# Patient Record
Sex: MALE | Race: BLACK OR AFRICAN AMERICAN | Employment: UNEMPLOYED | URBAN - METROPOLITAN AREA
[De-identification: names, ages, dates, MRNs, and addresses within clinical notes are randomized per-mention and may not be internally consistent; named-entity substitution may affect disease eponyms.]

---

## 2018-02-02 ENCOUNTER — OFFICE VISIT (OUTPATIENT)
Dept: FAMILY MEDICINE CLINIC | Age: 23
End: 2018-02-02

## 2018-02-02 ENCOUNTER — HOSPITAL ENCOUNTER (OUTPATIENT)
Dept: LAB | Age: 23
Discharge: HOME | End: 2018-02-02
Payer: SELF-PAY

## 2018-02-02 VITALS
WEIGHT: 172 LBS | SYSTOLIC BLOOD PRESSURE: 113 MMHG | TEMPERATURE: 97.7 F | RESPIRATION RATE: 12 BRPM | HEIGHT: 71 IN | DIASTOLIC BLOOD PRESSURE: 74 MMHG | OXYGEN SATURATION: 98 % | BODY MASS INDEX: 24.08 KG/M2 | HEART RATE: 76 BPM

## 2018-02-02 DIAGNOSIS — Z20.2 EXPOSURE TO STD: Primary | ICD-10-CM

## 2018-02-02 DIAGNOSIS — Z20.2 EXPOSURE TO STD: ICD-10-CM

## 2018-02-02 LAB — RPR SER QL: NONREACTIVE

## 2018-02-02 PROCEDURE — 36415 COLL VENOUS BLD VENIPUNCTURE: CPT | Performed by: NURSE PRACTITIONER

## 2018-02-02 PROCEDURE — 86592 SYPHILIS TEST NON-TREP QUAL: CPT | Performed by: NURSE PRACTITIONER

## 2018-02-02 PROCEDURE — 87491 CHLMYD TRACH DNA AMP PROBE: CPT | Performed by: NURSE PRACTITIONER

## 2018-02-02 PROCEDURE — 87389 HIV-1 AG W/HIV-1&-2 AB AG IA: CPT | Performed by: NURSE PRACTITIONER

## 2018-02-02 NOTE — PROGRESS NOTES
HISTORY OF PRESENT ILLNESS  Kailey Rudd is a 25 y.o. male. Patient presents today with an exposure to STD    HPI  He recently had gotten treated for Chlamydia. Review of Systems   Constitutional: Negative. Respiratory: Negative. Cardiovascular: Negative. Gastrointestinal: Negative. Genitourinary: Negative. Visit Vitals    /74 (BP 1 Location: Right arm, BP Patient Position: Sitting)    Pulse 76    Temp 97.7 °F (36.5 °C) (Oral)    Resp 12    Ht 5' 11\" (1.803 m)    Wt 172 lb (78 kg)    SpO2 98%    BMI 23.99 kg/m2       Physical Exam   Constitutional: He appears well-developed. No distress. Eyes: Right eye exhibits no discharge. Left eye exhibits no discharge. Neck: Normal range of motion. Neck supple. Cardiovascular: Normal rate, regular rhythm and normal heart sounds. Pulmonary/Chest: Effort normal and breath sounds normal. No respiratory distress. He has no wheezes. He has no rales. Lymphadenopathy:     He has no cervical adenopathy. ASSESSMENT and PLAN    ICD-10-CM ICD-9-CM    1. Exposure to STD Z20.2 V01.6 CHLAMYDIA/NEISSERIA/TRICHOMONAS AMP      HIV 1/2 AG/AB, 4TH GENERATION,W RFLX CONFIRM      RPR     PLAN:  We discussed high risk exposure. We discussed the different STDs  Pt is to call with any concerns. Pt given after visit summary.

## 2018-02-02 NOTE — MR AVS SNAPSHOT
[address] [facility] 
 
 
 [address] [address] 26[address] [facility] [address] 
[phone] Patient: Adama Monte MRN: [id number] :1995 Visit Information Date & Time Provider Department Dept. Phone Encounter #  
 2018  8:45 AM [facility] Frankel[facility] [phone] [id number] Follow-up Instructions Return if symptoms worsen or fail to improve. Upcoming Health Maintenance Date Due DTaP/Tdap/Td series (1 - Tdap) 3/27/2016 Influenza Age 5 to Adult 2017 Allergies as of 2018  Review Complete On: 2018 By: Elias Frankel, NP No Known Allergies Current Immunizations  Never Reviewed No immunizations on file. Not reviewed this visit You Were Diagnosed With   
  
 Codes Comments Exposure to STD    -  Primary ICD-10-CM: Z20.2 ICD-9-CM: V01.6 Vitals BP Pulse Temp Resp Height(growth percentile) Weight(growth percentile) 113/74 (BP 1 Location: Right arm, BP Patient Position: Sitting) 76 97.7 °F (36.5 °C) (Oral) 12 5' 11\" (1.803 m) 172 lb (78 kg) SpO2 BMI Smoking Status 98% 23.99 kg/m2 Current Every Day Smoker BMI and BSA Data Body Mass Index Body Surface Area  
 23.99 kg/m 2 1.98 m 2 Preferred Pharmacy Pharmacy Name Phone [facility] [address] [place] [address] [phone] Your Updated Medication List  
  
Notice  As of 2018  9:25 AM  
 You have not been prescribed any medications. Follow-up Instructions Return if symptoms worsen or fail to improve. To-Do List   
 2018 Lab:  CHLAMYDIA/NEISSERIA/TRICHOMONAS AMP   
  
 2018 Lab:  HIV 1/2 AG/AB, 4TH GENERATION,W RFLX CONFIRM   
  
 2018 Lab:  R[facility] Introducing [facility] HE[facility]!    
 [facility] introduces "[employer] patient portal. Now you can access parts of your medical record, email your doctor's office, and request medication refills online. 1. In your internet browser, go to [email or web address] 2. Click on the First Time User? Click Here link in the Sign In box. You will see the New Member Sign Up page. 3. Enter your MedA[employer] Systems Access Code exactly as it appears below. You will not need to use this code after youve completed the sign-up process. If you do not sign up before the expiration date, you must request a new code. · [employer] Access Code: [email or web address] Expires: 5/3/2018  8:58 AM 
 
4. Enter the last four digits of your Social Security Number (xxxx) and Date of Birth (mm/dd/yyyy) as indicated and click Submit. You will be taken to the next sign-up page. 5. Create a [employer] Systems ID. This will be your [employer] login ID and cannot be changed, so think of one that is secure and easy to remember. 6. Create a [employer] password. You can change your password at any time. 7. Enter your Password Reset Question and Answer. This can be used at a later time if you forget your password. 8. Enter your e-mail address. You will receive e-mail notification when new information is available in [address] 9. Click Sign Up. You can now view and download portions of your medical record. 10. Click the Download Summary menu link to download a portable copy of your medical information. If you have questions, please visit the Frequently Asked Questions section of the [employer] website. Remember, [employer] Systems is NOT to be used for urgent needs. For medical emergencies, dial 911. Now available from your iPhone and Android! Please provide this summary of care documentation to your next provider. Your primary care clinician is listed as Susana Calderón. If you have any questions after today's visit, please call [phone].

## 2018-02-02 NOTE — PROGRESS NOTES
1. Have you been to the ER, urgent care clinic since your last visit? Hospitalized since your last visit? No    2. Have you seen or consulted any other health care providers outside of the [address] [place] [address] since your last visit? Include any pap smears or colon screening.  No

## 2018-02-05 ENCOUNTER — TELEPHONE (OUTPATIENT)
Dept: FAMILY MEDICINE CLINIC | Age: 23
End: 2018-02-05

## 2018-02-05 LAB
HIV 1+2 AB+HIV1 P24 AG SERPL QL IA: NONREACTIVE
HIV12 RESULT COMMENT, HHIVC: NORMAL

## 2018-02-05 NOTE — TELEPHONE ENCOUNTER
----- Message from Alxeey Jacobs NP sent at 2/4/2018 12:32 PM EST -----  Please advise Pt that his test for syphilis is negative. Other test still pending results.

## 2018-02-06 ENCOUNTER — TELEPHONE (OUTPATIENT)
Dept: FAMILY MEDICINE CLINIC | Age: 23
End: 2018-02-06

## 2018-02-06 LAB
C TRACH RRNA SPEC QL NAA+PROBE: NEGATIVE
N GONORRHOEA RRNA SPEC QL NAA+PROBE: NEGATIVE
SPECIMEN SOURCE: NORMAL
T VAGINALIS RRNA SPEC QL NAA+PROBE: NEGATIVE

## 2018-02-06 NOTE — TELEPHONE ENCOUNTER
----- Message from Radha Muniz NP sent at 2/6/2018  1:19 PM EST -----  Please advise Pt culture negative for STD

## 2018-03-14 ENCOUNTER — OFFICE VISIT (OUTPATIENT)
Dept: FAMILY MEDICINE CLINIC | Age: 23
End: 2018-03-14

## 2018-03-14 VITALS
HEIGHT: 71 IN | OXYGEN SATURATION: 100 % | DIASTOLIC BLOOD PRESSURE: 81 MMHG | TEMPERATURE: 100.8 F | WEIGHT: 174 LBS | HEART RATE: 111 BPM | SYSTOLIC BLOOD PRESSURE: 132 MMHG | BODY MASS INDEX: 24.36 KG/M2 | RESPIRATION RATE: 18 BRPM

## 2018-03-14 DIAGNOSIS — J02.9 PHARYNGITIS, UNSPECIFIED ETIOLOGY: ICD-10-CM

## 2018-03-14 DIAGNOSIS — R50.9 FEVER, UNSPECIFIED FEVER CAUSE: Primary | ICD-10-CM

## 2018-03-14 LAB
QUICKVUE INFLUENZA TEST: NEGATIVE
S PYO AG THROAT QL: NEGATIVE
VALID INTERNAL CONTROL?: YES
VALID INTERNAL CONTROL?: YES

## 2018-03-14 RX ORDER — AMOXICILLIN 875 MG/1
875 TABLET, FILM COATED ORAL 2 TIMES DAILY
Qty: 20 TAB | Refills: 0 | Status: SHIPPED | OUTPATIENT
Start: 2018-03-14 | End: 2018-03-24

## 2018-03-14 NOTE — PROGRESS NOTES
OFFICE NOTE    Erica Martel is a 25 y.o. male presenting today for office visit. 3/14/2018  1:50 PM    Chief Complaint   Patient presents with    Establish Care    Sore Throat    Fatigue    Chills       HPI: Here today as a new patient, transferring care from [facility]. Used to see Dr Srinivas Youngblood. Main complaints today of cold symptoms that have been happening since Saturday. Missed school this week due to feeling poorly. Has been having geadaches, a little nasal congestion, sore throat, fatigue, and chills that have been happening since Saturday. Most bothersome symptom is sore throat and chills. Has not been around sick contacts. Did not get the flu shot. Has not been taking anything to help. Review of Systems   Constitutional: Positive for chills, fatigue and fever. HENT: Positive for congestion, rhinorrhea and sore throat. Negative for ear pain, facial swelling, postnasal drip, sinus pain, sinus pressure, sneezing, trouble swallowing and voice change. Eyes: Negative for pain, discharge, itching and visual disturbance. Respiratory: Negative for cough, shortness of breath and wheezing. Cardiovascular: Negative for chest pain. Gastrointestinal: Negative for abdominal pain, diarrhea, nausea and vomiting. Musculoskeletal: Negative for arthralgias and myalgias. Skin: Negative for rash. Allergic/Immunologic: Negative for environmental allergies. Neurological: Negative for headaches. PHQ Screening   PHQ over the last two weeks 3/14/2018   Little interest or pleasure in doing things Several days   Feeling down, depressed or hopeless Several days   Total Score PHQ 2 2         History  Past Medical History:   Diagnosis Date    Kidney laceration 09/2013    from tackle in football- left kidney       No past surgical history on file.     Social History     Social History    Marital status: SINGLE     Spouse name: N/A    Number of children: N/A    Years of education: N/A Occupational History    Not on file. Social History Main Topics    Smoking status: Current Every Day Smoker    Smokeless tobacco: Never Used    Alcohol use No    Drug use: No    Sexual activity: Yes     Partners: Female     Birth control/ protection: Condom     Other Topics Concern    Not on file     Social History Narrative       Family History   Problem Relation Age of Onset    No Known Problems Mother     No Known Problems Father        No Known Allergies    No current daily medications    Health Maintenance and Screenings  Colon Cancer: . Not indicated for early screening   COPD Screen/ Lung Cancer: Current smoker- will need to discuss further- cessation encouraged  CAD Screen: No lipid panel noted- will discuss in future  Diabetes: Glucose 96 2013  STD and HIV Screen: Recently completed screening  Osteoporosis Screen: Not indicated  Prostate Cancer: Not indicated for early screening   Abdominal Aneurysm Screen: Will need screening in future- smoker  Opthalmology:  Dentistry Services:   Hearing Impairment: No hearing loss noted  Skin Cancer Screen:   Obesity Screen: Body mass index is 24.27 kg/(m^2). Flu Vaccination: Declines  Pneumococcal Vaccine: Not indicated for early vaccination   Shingles Vaccine: Not indicated for early vaccination   Tetanus Booster:   Hepatitis B Vaccinations:     *will continue to follow up on      Advance Care Planning:   Patient was offered the opportunity to discuss advance care planning NO   Does patient have an Advance Directive: If no, did you provide information on Caring Connections?          Patient Care Team:  Patient Care Team:  Raffi Moreira NP as PCP - General (Nurse Practitioner)        LABS:    Results for orders placed or performed in visit on 03/14/18   AMB POC RAPID INFLUENZA TEST   Result Value Ref Range    VALID INTERNAL CONTROL POC Yes     QuickVue Influenza test Negative Negative   AMB POC RAPID STREP A   Result Value Ref Range    VALID INTERNAL CONTROL POC Yes     Group A Strep Ag Negative Negative       RADIOLOGY:  None new to review        Physical Exam   Constitutional: He is oriented to person, place, and time. He appears well-developed and well-nourished. +appears fatigued   HENT:   Right Ear: Tympanic membrane, external ear and ear canal normal. Tympanic membrane is not injected and not erythematous. No middle ear effusion. Left Ear: Tympanic membrane, external ear and ear canal normal. Tympanic membrane is not injected and not erythematous. No middle ear effusion. Nose: Mucosal edema present. No rhinorrhea. Right sinus exhibits no maxillary sinus tenderness and no frontal sinus tenderness. Left sinus exhibits no maxillary sinus tenderness and no frontal sinus tenderness. Mouth/Throat: Uvula is midline and mucous membranes are normal. Oropharyngeal exudate, posterior oropharyngeal edema and posterior oropharyngeal erythema present. No tonsillar abscesses. -bilateral tonsils +2 with mild amount of exudate; erythema present   Eyes: EOM are normal. Pupils are equal, round, and reactive to light. Right eye exhibits no discharge. Left eye exhibits no discharge. Neck: Normal range of motion. Neck supple. Cardiovascular: Normal rate, regular rhythm and normal heart sounds. No murmur heard. Pulmonary/Chest: Effort normal and breath sounds normal. No respiratory distress. Abdominal: Soft. Bowel sounds are normal. There is no tenderness. Lymphadenopathy:     He has cervical adenopathy. Neurological: He is alert and oriented to person, place, and time. Skin: Skin is warm and dry. No rash noted.          Vitals:    03/14/18 1345   BP: 132/81   Pulse: (!) 111   Resp: 18   Temp: (!) 100.8 °F (38.2 °C)   TempSrc: Oral   SpO2: 100%   Weight: 174 lb (78.9 kg)   Height: 5' 11\" (1.803 m)   PainSc:   0 - No pain         Assessment and Plan    Fever, unspecified fever cause  *POC testing negative in office; however, examination reveals consistency with strep pharyngitis. Treated as below. - AMB POC RAPID INFLUENZA TEST  - AMB POC RAPID STREP A    Pharyngitis, unspecified etiology  *Noted exudate and erythema on examination of throat- will start on AB therapy at this time. PRN salt water gargles, NSAID, Tylenol, rest, and hydration. Advised that he should start to feel slightly better in 24-48 hours. - amoxicillin (AMOXIL) 875 mg tablet; Take 1 Tab by mouth two (2) times a day for 10 days. Dispense: 20 Tab; Refill: 0        *Plan of care reviewed with patient. Patient in agreement with plan and expresses understanding. All questions answered and patient encouraged to call or RTO if further questions or concerns. Follow-up Disposition:  Return in about 1 year (around 3/14/2019) for CPE- 30 mins. Evelina Ibarra

## 2018-03-14 NOTE — LETTER
NOTIFICATION RETURN TO WORK / SCHOOL 
 
3/14/2018 2:11 PM 
 
Mr. Agus Nick [address] [place] [address] [address] [address] [address] [address] To Whom It May Concern: 
 
Agus Nick is currently under the care of [address] [place] [address]. He was seen today in our office at this time. Please excuse him from school this week so far. He will return to school on Friday 3/16. If there are questions or concerns please have the patient contact our office.  
 
 
 
Sincerely, 
 
 
 
 
 
Katie Dalal NP

## 2018-03-14 NOTE — PATIENT INSTRUCTIONS
Sore Throat: Care Instructions  Your Care Instructions    Infection by bacteria or a virus causes most sore throats. Cigarette smoke, dry air, air pollution, allergies, and yelling can also cause a sore throat. Sore throats can be painful and annoying. Fortunately, most sore throats go away on their own. If you have a bacterial infection, your doctor may prescribe antibiotics. Follow-up care is a key part of your treatment and safety. Be sure to make and go to all appointments, and call your doctor if you are having problems. It's also a good idea to know your test results and keep a list of the medicines you take. How can you care for yourself at home? · If your doctor prescribed antibiotics, take them as directed. Do not stop taking them just because you feel better. You need to take the full course of antibiotics. · Gargle with warm salt water once an hour to help reduce swelling and relieve discomfort. Use 1 teaspoon of salt mixed in 1 cup of warm water. · Take an over-the-counter pain medicine, such as acetaminophen (Tylenol), ibuprofen (Advil, Motrin), or naproxen (Aleve). Read and follow all instructions on the label. · Be careful when taking over-the-counter cold or flu medicines and Tylenol at the same time. Many of these medicines have acetaminophen, which is Tylenol. Read the labels to make sure that you are not taking more than the recommended dose. Too much acetaminophen (Tylenol) can be harmful. · Drink plenty of fluids. Fluids may help soothe an irritated throat. Hot fluids, such as tea or soup, may help decrease throat pain. · Use over-the-counter throat lozenges to soothe pain. Regular cough drops or hard candy may also help. These should not be given to young children because of the risk of choking. · Do not smoke or allow others to smoke around you. If you need help quitting, talk to your doctor about stop-smoking programs and medicines.  These can increase your chances of quitting for good. · Use a vaporizer or humidifier to add moisture to your bedroom. Follow the directions for cleaning the machine. When should you call for help? Call your doctor now or seek immediate medical care if:  ? · You have new or worse trouble swallowing. ? · Your sore throat gets much worse on one side. ? Watch closely for changes in your health, and be sure to contact your doctor if you do not get better as expected. Where can you learn more? Go to http://елена-linda.info/. Enter 062 441 80 [id number] in the search box to learn more about \"Sore Throat: Care Instructions. \"  Current as of: May 12, 2017  Content Version: 11.4  © 4371-5441 Healthwise, Incorporated. Care instructions adapted under license by [employer] (which disclaims liability or warranty for this information). If you have questions about a medical condition or this instruction, always ask your healthcare professional. [address] any warranty or liability for your use of this information.

## 2018-03-14 NOTE — MR AVS SNAPSHOT
[facility] [address] [facility] 
[phone] Patient: Therese Rodgers MRN: [id number] :1995 Visit Information Date & Time Provider Department Dept. Phone Encounter #  
 3/14/2018  1:30 PM Windy Haines, [facility] [phone] Follow-up Instructions Return in about 1 year (around 3/14/2019) for CPE- 30 mins. Roro Mcdonald Upcoming Health Maintenance Date Due DTaP/Tdap/Td series (1 - Tdap) 3/27/2016 Influenza Age 5 to Adult 2017 Allergies as of 3/14/2018  Review Complete On: 3/14/2018 By: Windy Haines NP No Known Allergies Current Immunizations  Never Reviewed No immunizations on file. Not reviewed this visit You Were Diagnosed With   
  
 Codes Comments Fever, unspecified fever cause    -  Primary ICD-10-CM: R50.9 ICD-9-CM: 780.60 Pharyngitis, unspecified etiology     ICD-10-CM: J02.9 ICD-9-CM: 188 Vitals BP Pulse Temp Resp Height(growth percentile) Weight(growth percentile) 132/81 (BP 1 Location: Left arm, BP Patient Position: Sitting) (!) 111 (!) 100.8 °F (38.2 °C) (Oral) 18 5' 11\" (1.803 m) 174 lb (78.9 kg) SpO2 BMI Smoking Status 100% 24.27 kg/m2 Current Every Day Smoker BMI and BSA Data Body Mass Index Body Surface Area  
 24.27 kg/m 2 1.99 m 2 Preferred Pharmacy Pharmacy Name Phone [facility] [address] [place] [address] AT [facility] [phone] Your Updated Medication List  
  
   
This list is accurate as of 3/14/18  2:13 PM.  Always use your most recent med list.  
  
  
  
  
 amoxicillin 875 mg tablet Commonly known as:  AMOXIL Take 1 Tab by mouth two (2) times a day for 10 days. Prescriptions Sent to Pharmacy  Refills  
 amoxicillin (AMOXIL) 875 mg tablet 0  
 Sig: Take 1 Tab by mouth two (2) times a day for 10 days. Class: Normal  
 [facility] [address] [place] [address] [address]  #: [phone] Route: Oral  
  
We Performed the Following AMB POC RAPID INFLUENZA TEST [52878 CPT(R)] AMB POC RAPID STREP A [37919 CPT(R)] Follow-up Instructions Return in about 1 year (around 3/14/2019) for CPE- 30 mins. Laura Del Rosario Patient Instructions Sore Throat: Care Instructions Your Care Instructions Infection by bacteria or a virus causes most sore throats. Cigarette smoke, dry air, air pollution, allergies, and yelling can also cause a sore throat. Sore throats can be painful and annoying. Fortunately, most sore throats go away on their own. If you have a bacterial infection, your doctor may prescribe antibiotics. Follow-up care is a key part of your treatment and safety. Be sure to make and go to all appointments, and call your doctor if you are having problems. It's also a good idea to know your test results and keep a list of the medicines you take. How can you care for yourself at home? · If your doctor prescribed antibiotics, take them as directed. Do not stop taking them just because you feel better. You need to take the full course of antibiotics. · Gargle with warm salt water once an hour to help reduce swelling and relieve discomfort. Use 1 teaspoon of salt mixed in 1 cup of warm water. · Take an over-the-counter pain medicine, such as acetaminophen (Tylenol), ibuprofen (Advil, Motrin), or naproxen (Aleve). Read and follow all instructions on the label. · Be careful when taking over-the-counter cold or flu medicines and Tylenol at the same time. Many of these medicines have acetaminophen, which is Tylenol. Read the labels to make sure that you are not taking more than the recommended dose. Too much acetaminophen (Tylenol) can be harmful. · Drink plenty of fluids. Fluids may help soothe an irritated throat. Hot fluids, such as tea or soup, may help decrease throat pain. · Use over-the-counter throat lozenges to soothe pain. Regular cough drops or hard candy may also help. These should not be given to young children because of the risk of choking. · Do not smoke or allow others to smoke around you. If you need help quitting, talk to your doctor about stop-smoking programs and medicines. These can increase your chances of quitting for good. · Use a vaporizer or humidifier to add moisture to your bedroom. Follow the directions for cleaning the machine. When should you call for help? Call your doctor now or seek immediate medical care if: 
? · You have new or worse trouble swallowing. ? · Your sore throat gets much worse on one side. ? Watch closely for changes in your health, and be sure to contact your doctor if you do not get better as expected. Where can you learn more? Go to http://елена-linda.info/. Enter [phone] [id number] [phone] in the search box to learn more about \"Sore Throat: Care Instructions. \" Current as of: May 12, 2017 Content Version: 11.4 © 4786-9679 Healthwise, garbs. Care instructions adapted under license by [employer] (which disclaims liability or warranty for this information). If you have questions about a medical condition or this instruction, always ask your healthcare professional. [address] [address] any warranty or liability for your use of this information. Introducing [facility]! [address] introduces [employer] patient portal. Now you can access parts of your medical record, email your doctor's office, and request medication refills online. 1. In your internet browser, go to [email or web address] 2. Click on the First Time User? Click Here link in the Sign In box. You will see the New Member Sign Up page. 3. Enter your Pikum Access Code exactly as it appears below. You will not need to use this code after youve completed the sign-up process. If you do not sign up before the expiration date, you must request a new code. · Pikum Access Code: [email or web address] Expires: 5/3/2018  9:58 AM 
 
4. Enter the last four digits of your Social Security Number (xxxx) and Date of Birth (mm/dd/yyyy) as indicated and click Submit. You will be taken to the next sign-up page. 5. Create a Pikum ID. This will be your Pikum login ID and cannot be changed, so think of one that is secure and easy to remember. 6. Create a Pikum password. You can change your password at any time. 7. Enter your Password Reset Question and Answer. This can be used at a later time if you forget your password. 8. Enter your e-mail address. You will receive e-mail notification when new information is available in [address] 9. Click Sign Up. You can now view and download portions of your medical record. 10. Click the Download Summary menu link to download a portable copy of your medical information. If you have questions, please visit the Frequently Asked Questions section of the Pikum website. Remember, Pikum is NOT to be used for urgent needs. For medical emergencies, dial 911. Now available from your iPhone and Android! Please provide this summary of care documentation to your next provider. Your primary care clinician is listed as Kennth Arms. If you have any questions after today's visit, please call [phone].

## 2018-03-14 NOTE — PROGRESS NOTES
Chief Complaint   Patient presents with    Establish Care    Sore Throat    Fatigue    Chills     1. Have you been to the ER, urgent care clinic since your last visit? Hospitalized since your last visit? No    2. Have you seen or consulted any other health care providers outside of the [address] [place] [address] since your last visit? Include any pap smears or colon screening.  No

## 2019-10-23 ENCOUNTER — OFFICE VISIT (OUTPATIENT)
Dept: FAMILY MEDICINE CLINIC | Age: 24
End: 2019-10-23

## 2019-10-23 VITALS
DIASTOLIC BLOOD PRESSURE: 68 MMHG | RESPIRATION RATE: 18 BRPM | WEIGHT: 166.6 LBS | HEIGHT: 71 IN | OXYGEN SATURATION: 98 % | TEMPERATURE: 98.6 F | SYSTOLIC BLOOD PRESSURE: 114 MMHG | BODY MASS INDEX: 23.32 KG/M2 | HEART RATE: 82 BPM

## 2019-10-23 DIAGNOSIS — R36.0 ABNORMAL PENILE DISCHARGE, WITHOUT BLOOD: Primary | ICD-10-CM

## 2019-10-23 LAB
BILIRUB UR QL STRIP: NEGATIVE
GLUCOSE UR-MCNC: NEGATIVE MG/DL
KETONES P FAST UR STRIP-MCNC: NORMAL MG/DL
PH UR STRIP: 7.5 [PH] (ref 4.6–8)
PROT UR QL STRIP: NORMAL
SP GR UR STRIP: 1.02 (ref 1–1.03)
UA UROBILINOGEN AMB POC: NORMAL (ref 0.2–1)
URINALYSIS CLARITY POC: CLEAR
URINALYSIS COLOR POC: YELLOW
URINE BLOOD POC: NEGATIVE
URINE LEUKOCYTES POC: NORMAL
URINE NITRITES POC: NEGATIVE

## 2019-10-23 NOTE — PROGRESS NOTES
HPI  Pt presents to establish care. Asking to be checked for STDs. Having clear discharge from penis at times for about 2.5 weeks. No burning with urination. No pain in testicles. No sores or rashes in genital area. Reports history of chlamydia \"years ago\"     Has two female partners recently. Broke up with #1 recently to be with #2 but was having sex with both during same time period. No unprotected intercourse with either gal.  Unprotected oral sex. Only has sex with women    Past Medical History  Past Medical History:   Diagnosis Date    Kidney laceration 09/2013    from tackle in football- left kidney       Surgical History  No past surgical history on file.      Medications      Allergies  No Known Allergies    Family History  Family History   Problem Relation Age of Onset    No Known Problems Mother     No Known Problems Father        Social History  Social History     Socioeconomic History    Marital status: SINGLE     Spouse name: Not on file    Number of children: Not on file    Years of education: Not on file    Highest education level: Not on file   Occupational History    Not on file   Social Needs    Financial resource strain: Not on file    Food insecurity:     Worry: Not on file     Inability: Not on file    Transportation needs:     Medical: Not on file     Non-medical: Not on file   Tobacco Use    Smoking status: Current Every Day Smoker    Smokeless tobacco: Never Used   Substance and Sexual Activity    Alcohol use: No    Drug use: No    Sexual activity: Yes     Partners: Female     Birth control/protection: Condom   Lifestyle    Physical activity:     Days per week: Not on file     Minutes per session: Not on file    Stress: Not on file   Relationships    Social connections:     Talks on phone: Not on file     Gets together: Not on file     Attends Spiritism service: Not on file     Active member of club or organization: Not on file     Attends meetings of clubs or organizations: Not on file     Relationship status: Not on file    Intimate partner violence:     Fear of current or ex partner: Not on file     Emotionally abused: Not on file     Physically abused: Not on file     Forced sexual activity: Not on file   Other Topics Concern    Not on file   Social History Narrative    Not on file       Problem List  Patient Active Problem List   Diagnosis Code    Abnormal penile discharge, without blood R36.9       Review of Systems  Review of Systems   Constitutional: Negative. Respiratory: Negative. Cardiovascular: Negative. Gastrointestinal: Negative. Genitourinary:        Clear discharge   Neurological: Negative. Vital Signs  Vitals:    10/23/19 1356   BP: 114/68   Pulse: 82   Resp: 18   Temp: 98.6 °F (37 °C)   TempSrc: Oral   SpO2: 98%   Weight: 166 lb 9.6 oz (75.6 kg)   Height: 5' 11\" (1.803 m)   PainSc:   0 - No pain       Physical Exam  Physical Exam   Constitutional: He is oriented to person, place, and time. He appears well-developed and well-nourished. No distress. Cardiovascular: Normal rate, regular rhythm and normal heart sounds. Pulmonary/Chest: Effort normal and breath sounds normal. No respiratory distress. Genitourinary:   Genitourinary Comments: declined   Neurological: He is alert and oriented to person, place, and time. Skin: Skin is warm and dry. Nursing note and vitals reviewed.       Diagnostics  Orders Placed This Encounter    CULTURE, URINE     Standing Status:   Future     Standing Expiration Date:   10/23/2020    URINALYSIS W/MICROSCOPIC     Standing Status:   Future     Standing Expiration Date:   10/23/2020    CT/NG/T.VAGINALIS AMPLIFICATION     Standing Status:   Future     Standing Expiration Date:   10/23/2020     Order Specific Question:   Specimen source     Answer:   Urine [258]    HIV 1/2 AG/AB, 4TH GENERATION,W RFLX CONFIRM     Standing Status:   Future     Standing Expiration Date:   10/23/2020    RPR Standing Status:   Future     Standing Expiration Date:   10/23/2020    AMB POC URINALYSIS DIP STICK AUTO W/O MICRO       Results  Results for orders placed or performed in visit on 10/23/19   AMB POC URINALYSIS DIP STICK AUTO W/O MICRO   Result Value Ref Range    Color (UA POC) Yellow     Clarity (UA POC) Clear     Glucose (UA POC) Negative Negative    Bilirubin (UA POC) Negative Negative    Ketones (UA POC) Trace Negative    Specific gravity (UA POC) 1.020 1.001 - 1.035    Blood (UA POC) Negative Negative    pH (UA POC) 7.5 4.6 - 8.0    Protein (UA POC) Trace Negative    Urobilinogen (UA POC) 1 mg/dL 0.2 - 1    Nitrites (UA POC) Negative Negative    Leukocyte esterase (UA POC) Trace Negative     Assessment and Plan  Diagnoses and all orders for this visit:    1. Abnormal penile discharge, without blood  -     URINALYSIS W/MICROSCOPIC; Future  -     CT/NG/T.VAGINALIS AMPLIFICATION; Future  -     HIV 1/2 AG/AB, 4TH GENERATION,W RFLX CONFIRM; Future  -     RPR; Future  -     CULTURE, URINE; Future  -     AMB POC URINALYSIS DIP STICK AUTO W/O MICRO    Reviewed signs/symptoms/transmission of STDs. Encouraged safe sex and being selective of partners    After care summary printed and reviewed with patient. Plan reviewed with patient. Questions answered. Patient verbalized understanding of plan and is in agreement with plan. Patient to follow up if symptoms worsen/ do not improve. Encouraged the use of my chart.     SOO Duncan

## 2019-10-23 NOTE — PROGRESS NOTES
Lupillo Cardona is a  25 y.o. male presents today for office visit for establish care. 1. Have you been to the ER, urgent care clinic or hospitalized since your last visit? NO    2. Have you seen or consulted any other health care providers outside of the [address] [place] [address] since your last visit (Include any pap smears or colon screening)? NO

## 2019-10-31 ENCOUNTER — HOSPITAL ENCOUNTER (OUTPATIENT)
Dept: LAB | Age: 24
Discharge: HOME | End: 2019-10-31
Payer: COMMERCIAL

## 2019-10-31 ENCOUNTER — OFFICE VISIT (OUTPATIENT)
Dept: FAMILY MEDICINE CLINIC | Age: 24
End: 2019-10-31

## 2019-10-31 DIAGNOSIS — R36.0 ABNORMAL PENILE DISCHARGE, WITHOUT BLOOD: ICD-10-CM

## 2019-10-31 LAB
APPEARANCE UR: CLEAR
BACTERIA URNS QL MICRO: NEGATIVE /HPF
BILIRUB UR QL: NEGATIVE
COLOR UR: YELLOW
EPITH CASTS URNS QL MICRO: ABNORMAL /LPF (ref 0–5)
GLUCOSE UR STRIP.AUTO-MCNC: NEGATIVE MG/DL
HGB UR QL STRIP: NEGATIVE
KETONES UR QL STRIP.AUTO: NEGATIVE MG/DL
LEUKOCYTE ESTERASE UR QL STRIP.AUTO: NEGATIVE
MUCOUS THREADS URNS QL MICRO: ABNORMAL /LPF
NITRITE UR QL STRIP.AUTO: NEGATIVE
PH UR STRIP: 8.5 [PH] (ref 5–8)
PROT UR STRIP-MCNC: NEGATIVE MG/DL
RBC #/AREA URNS HPF: ABNORMAL /HPF (ref 0–5)
SP GR UR REFRACTOMETRY: 1.02 (ref 1–1.03)
UROBILINOGEN UR QL STRIP.AUTO: 0.2 EU/DL (ref 0.2–1)
WBC URNS QL MICRO: ABNORMAL /HPF (ref 0–4)

## 2019-10-31 PROCEDURE — 81001 URINALYSIS AUTO W/SCOPE: CPT

## 2019-10-31 PROCEDURE — 86592 SYPHILIS TEST NON-TREP QUAL: CPT

## 2019-10-31 PROCEDURE — 87491 CHLMYD TRACH DNA AMP PROBE: CPT

## 2019-10-31 PROCEDURE — 87389 HIV-1 AG W/HIV-1&-2 AB AG IA: CPT

## 2019-10-31 PROCEDURE — 36415 COLL VENOUS BLD VENIPUNCTURE: CPT

## 2019-10-31 PROCEDURE — 87086 URINE CULTURE/COLONY COUNT: CPT

## 2019-11-01 LAB
BACTERIA SPEC CULT: NORMAL
HIV 1+2 AB+HIV1 P24 AG SERPL QL IA: NONREACTIVE
HIV12 RESULT COMMENT, HHIVC: NORMAL
RPR SER QL: NONREACTIVE
SERVICE CMNT-IMP: NORMAL

## 2019-11-04 LAB
C TRACH RRNA SPEC QL NAA+PROBE: NEGATIVE
N GONORRHOEA RRNA SPEC QL NAA+PROBE: NEGATIVE
SPECIMEN SOURCE: NORMAL
T VAGINALIS RRNA VAG QL NAA+PROBE: NEGATIVE